# Patient Record
Sex: FEMALE | Race: WHITE | Employment: FULL TIME | URBAN - METROPOLITAN AREA
[De-identification: names, ages, dates, MRNs, and addresses within clinical notes are randomized per-mention and may not be internally consistent; named-entity substitution may affect disease eponyms.]

---

## 2017-03-25 ENCOUNTER — APPOINTMENT (OUTPATIENT)
Dept: ULTRASOUND IMAGING | Facility: HOSPITAL | Age: 22
End: 2017-03-25
Attending: EMERGENCY MEDICINE
Payer: MEDICAID

## 2017-03-25 ENCOUNTER — HOSPITAL ENCOUNTER (EMERGENCY)
Facility: HOSPITAL | Age: 22
Discharge: HOME | End: 2017-03-26
Attending: EMERGENCY MEDICINE
Payer: MEDICAID

## 2017-03-25 VITALS
SYSTOLIC BLOOD PRESSURE: 112 MMHG | TEMPERATURE: 98 F | HEART RATE: 64 BPM | WEIGHT: 143 LBS | HEIGHT: 64 IN | BODY MASS INDEX: 24.41 KG/M2 | OXYGEN SATURATION: 100 % | RESPIRATION RATE: 12 BRPM | DIASTOLIC BLOOD PRESSURE: 74 MMHG

## 2017-03-25 DIAGNOSIS — R10.2 PELVIC PAIN: Primary | ICD-10-CM

## 2017-03-25 LAB
B-HCG UR QL: NEGATIVE
BILIRUB UR QL: NEGATIVE
CLARITY UR: CLEAR
COLOR UR: YELLOW
GLUCOSE UR-MCNC: NEGATIVE MG/DL
HGB UR QL STRIP.AUTO: NEGATIVE
KETONES UR-MCNC: NEGATIVE MG/DL
NITRITE UR QL STRIP.AUTO: NEGATIVE
PH UR: 6 [PH] (ref 5–8)
PROT UR-MCNC: NEGATIVE MG/DL
RBC #/AREA URNS AUTO: 2 /HPF
SP GR UR STRIP: 1 (ref 1–1.03)
UROBILINOGEN UR STRIP-ACNC: <2
VIT C UR-MCNC: NEGATIVE MG/DL
WBC #/AREA URNS AUTO: 4 /HPF

## 2017-03-25 PROCEDURE — 81025 URINE PREGNANCY TEST: CPT

## 2017-03-25 PROCEDURE — 87106 FUNGI IDENTIFICATION YEAST: CPT | Performed by: EMERGENCY MEDICINE

## 2017-03-25 PROCEDURE — 87205 SMEAR GRAM STAIN: CPT | Performed by: EMERGENCY MEDICINE

## 2017-03-25 PROCEDURE — 81001 URINALYSIS AUTO W/SCOPE: CPT | Performed by: EMERGENCY MEDICINE

## 2017-03-25 PROCEDURE — 87591 N.GONORRHOEAE DNA AMP PROB: CPT | Performed by: EMERGENCY MEDICINE

## 2017-03-25 PROCEDURE — 93975 VASCULAR STUDY: CPT

## 2017-03-25 PROCEDURE — 87491 CHLMYD TRACH DNA AMP PROBE: CPT | Performed by: EMERGENCY MEDICINE

## 2017-03-25 PROCEDURE — 99284 EMERGENCY DEPT VISIT MOD MDM: CPT

## 2017-03-25 PROCEDURE — 76830 TRANSVAGINAL US NON-OB: CPT

## 2017-03-25 PROCEDURE — 87808 TRICHOMONAS ASSAY W/OPTIC: CPT | Performed by: EMERGENCY MEDICINE

## 2017-03-25 PROCEDURE — 76856 US EXAM PELVIC COMPLETE: CPT

## 2017-03-25 RX ORDER — TRAMADOL HYDROCHLORIDE 50 MG/1
50 TABLET ORAL EVERY 8 HOURS PRN
Qty: 10 TABLET | Refills: 0 | Status: SHIPPED | OUTPATIENT
Start: 2017-03-25

## 2017-03-26 LAB
C TRACH DNA SPEC QL NAA+PROBE: POSITIVE
N GONORRHOEA DNA SPEC QL NAA+PROBE: NEGATIVE

## 2017-03-26 NOTE — ED PROVIDER NOTES
Patient Seen in: [facility] AND [facility] Emergency Department    History   Patient presents with:  Abdomen/Flank Pain (GI/)    Stated Complaint: abd pain    HPI    Healthy 42-year-old female who smokes with no abdominal surgeries who presents with bilateral intact distal pulses. Pulmonary/Chest: Effort normal. No respiratory distress. Abdominal: Soft. no rigidity guarding or distention. Patient has pain in both lower quadrants and suprapubic areas. The pain is not localized otherwise.    Genitourinary: [id number] [address] [phone]    Schedule an appointment as soon as possible for a visit in 2 days        Medications Prescribed:  Current Discharge Medication List    START taking these medications    TraMADol HCl 50 MG Oral Tab  Take 1 tab

## 2017-03-26 NOTE — ED NOTES
Patient updated with plan of care. Report received from JORDEN. Patient returned from us. Swabs sent to lab. All needs attended to call light in reach.

## 2017-03-28 LAB
GENITAL VAGINOSIS SCREEN: NEGATIVE
TRICHOMONAS SCREEN: NEGATIVE

## 2017-03-29 NOTE — PROGRESS NOTES
Quick Note:    This patient needs treatment for chlamydia with doxycycline 100 mg PO BID for 14 days. The patient also needs to be informed that they cannot have intercourse with any untreated or untested partners.   ______

## 2017-05-08 ENCOUNTER — HOSPITAL ENCOUNTER (EMERGENCY)
Facility: HOSPITAL | Age: 22
Discharge: HOME | End: 2017-05-08
Attending: EMERGENCY MEDICINE

## 2017-05-08 VITALS
OXYGEN SATURATION: 100 % | TEMPERATURE: 98 F | WEIGHT: 165 LBS | HEART RATE: 56 BPM | RESPIRATION RATE: 18 BRPM | DIASTOLIC BLOOD PRESSURE: 60 MMHG | SYSTOLIC BLOOD PRESSURE: 112 MMHG | BODY MASS INDEX: 28.17 KG/M2 | HEIGHT: 64 IN

## 2017-05-08 DIAGNOSIS — N30.01 ACUTE CYSTITIS WITH HEMATURIA: Primary | ICD-10-CM

## 2017-05-08 PROCEDURE — 81001 URINALYSIS AUTO W/SCOPE: CPT

## 2017-05-08 PROCEDURE — 87808 TRICHOMONAS ASSAY W/OPTIC: CPT | Performed by: EMERGENCY MEDICINE

## 2017-05-08 PROCEDURE — 87106 FUNGI IDENTIFICATION YEAST: CPT | Performed by: EMERGENCY MEDICINE

## 2017-05-08 PROCEDURE — 87205 SMEAR GRAM STAIN: CPT | Performed by: EMERGENCY MEDICINE

## 2017-05-08 PROCEDURE — 87591 N.GONORRHOEAE DNA AMP PROB: CPT | Performed by: EMERGENCY MEDICINE

## 2017-05-08 PROCEDURE — 99284 EMERGENCY DEPT VISIT MOD MDM: CPT

## 2017-05-08 PROCEDURE — 87086 URINE CULTURE/COLONY COUNT: CPT

## 2017-05-08 PROCEDURE — 81025 URINE PREGNANCY TEST: CPT

## 2017-05-08 PROCEDURE — 87491 CHLMYD TRACH DNA AMP PROBE: CPT | Performed by: EMERGENCY MEDICINE

## 2017-05-08 RX ORDER — NITROFURANTOIN 25; 75 MG/1; MG/1
100 CAPSULE ORAL 2 TIMES DAILY
Qty: 14 CAPSULE | Refills: 0 | Status: SHIPPED | OUTPATIENT
Start: 2017-05-08 | End: 2017-05-15

## 2017-05-08 RX ORDER — PHENAZOPYRIDINE HYDROCHLORIDE 100 MG/1
100 TABLET, FILM COATED ORAL 3 TIMES DAILY PRN
Qty: 6 TABLET | Refills: 0 | Status: SHIPPED | OUTPATIENT
Start: 2017-05-08 | End: 2017-05-15

## 2017-05-09 NOTE — ED PROVIDER NOTES
Patient Seen in: [facility] AND [facility] Emergency Department    History   Patient presents with:  Abdomen/Flank Pain (GI/)    Stated Complaint: stomach ache x 2 days    HPI    81-year-old female without significant past medical history presents with complai 98.4 °F (36.9 °C) (Oral)  Resp 18  Ht 162.6 cm (5' 4\")  Wt 74.844 kg  BMI 28.31 kg/m2  SpO2 100%  LMP 12/03/2016        Physical Exam    General Appearance: alert, no distress  Eyes: pupils equal and round no pallor or injection  ENT, Mouth: mucous Amrit Bench diagnosis)    Disposition:  Discharge    Follow-up:  Misael Abdi MD  [address] [facility]  [facility] [address] [phone]            Medications Prescribed:  Current Discharge Medication List    START taking these medications    Nitrofur

## 2017-06-25 ENCOUNTER — HOSPITAL ENCOUNTER (EMERGENCY)
Facility: HOSPITAL | Age: 22
Discharge: HOME | End: 2017-06-25
Attending: EMERGENCY MEDICINE
Payer: MEDICAID

## 2017-06-25 ENCOUNTER — APPOINTMENT (OUTPATIENT)
Dept: GENERAL RADIOLOGY | Facility: HOSPITAL | Age: 22
End: 2017-06-25
Attending: EMERGENCY MEDICINE
Payer: MEDICAID

## 2017-06-25 VITALS
HEART RATE: 77 BPM | TEMPERATURE: 98 F | DIASTOLIC BLOOD PRESSURE: 78 MMHG | SYSTOLIC BLOOD PRESSURE: 120 MMHG | RESPIRATION RATE: 18 BRPM | OXYGEN SATURATION: 100 %

## 2017-06-25 DIAGNOSIS — S93.401A MILD ANKLE SPRAIN, RIGHT, INITIAL ENCOUNTER: Primary | ICD-10-CM

## 2017-06-25 PROCEDURE — 99283 EMERGENCY DEPT VISIT LOW MDM: CPT

## 2017-06-25 PROCEDURE — 73630 X-RAY EXAM OF FOOT: CPT | Performed by: EMERGENCY MEDICINE

## 2017-06-25 PROCEDURE — 73610 X-RAY EXAM OF ANKLE: CPT | Performed by: EMERGENCY MEDICINE

## 2017-06-25 NOTE — ED NOTES
CMS intact, pt denies numbness/tingling. Pedal pulse 2+, pt can wiggle toes, moderate strength plantar flexion/extension. Pt given phone to call ride home.
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
## 2018-01-22 ENCOUNTER — APPOINTMENT (OUTPATIENT)
Dept: OTHER | Facility: HOSPITAL | Age: 23
End: 2018-01-22
Attending: FAMILY MEDICINE

## 2019-07-25 ENCOUNTER — NURSE ONLY (OUTPATIENT)
Dept: INTERNAL MEDICINE CLINIC | Facility: HOSPITAL | Age: 24
End: 2019-07-25
Attending: EMERGENCY MEDICINE

## 2019-07-25 DIAGNOSIS — Z00.00 WELLNESS EXAMINATION: Primary | ICD-10-CM

## 2019-07-25 LAB
RUBV IGG SER QL: POSITIVE
RUBV IGG SER-ACNC: 114.5 IU/ML (ref 10–?)

## 2019-07-25 PROCEDURE — 86787 VARICELLA-ZOSTER ANTIBODY: CPT

## 2019-07-25 PROCEDURE — 86765 RUBEOLA ANTIBODY: CPT

## 2019-07-25 PROCEDURE — 86735 MUMPS ANTIBODY: CPT

## 2019-07-25 PROCEDURE — 86480 TB TEST CELL IMMUN MEASURE: CPT

## 2019-07-25 PROCEDURE — 86762 RUBELLA ANTIBODY: CPT

## 2019-07-26 LAB
M TB IFN-G CD4+ T-CELLS BLD-ACNC: 0.09 IU/ML
M TB TUBERC IFN-G BLD QL: NEGATIVE
M TB TUBERC IGNF/MITOGEN IGNF CONTROL: 9.22 IU/ML
MEV IGG SER-ACNC: >300 AU/ML (ref 30–?)
MUV IGG SER IA-ACNC: 7.4 AU/ML (ref 11–?)
QUANTIFERON TB1 MINUS NIL: -0.03 IU/ML
QUANTIFERON TB2 MINUS NIL: -0.04 IU/ML
VZV IGG SER IA-ACNC: 1363 (ref 165–?)

## 2019-11-03 NOTE — ED INITIAL ASSESSMENT (HPI)
Pt arrives via ems after she states she has been feeling increasingly more depressed and 'she doesn't want to be here anymore'. States she has been having thoughts of harming herself but does not have a plan.  Also admits to drinking and going to half-way [place]
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
## 2019-11-03 NOTE — ED NOTES
Dad in waiting room, pt does not want to see dad or have him at bedside, advised dad of such, dad requesting we call with update, Jarad Escobedo, [phone] advised we would have to ask pt first he understands, he states if she does not want us to call him to pl

## 2019-11-03 NOTE — ED PROVIDER NOTES
Patient Seen in: [facility] AND [facility] Emergency Department    History   Patient presents with:  Alcohol Intoxication (neurologic)  Eval-P (psychiatric)      HPI    Patient presents to the ED complaining of feeling depressed.   She states that she has had geno tracheal deviation present. Cardiovascular: Normal rate and intact distal pulses. Pulmonary/Chest: Effort normal. No stridor. No respiratory distress. Abdominal: Soft. She exhibits no distension.  Musculoskeletal:         General: No deformity or manuela DIFFERENTIAL         Imaging Results Available and Reviewed while in ED:     ED Medications Administered:   Medications   acetaminophen (TYLENOL EXTRA STRENGTH) tab 1,000 mg (1,000 mg Oral Given 11/3/19 3545)         [facility]      11/03/19  8822 11/03/19  0442

## 2019-11-03 NOTE — ED PROVIDER NOTES
Patient is reevaluated once she is sober. Patient's no longer suicidal.  She was seen by behavioral health liaison and is safe for discharge home. She poses no harm to herself or others. Resources provided.

## 2019-11-03 NOTE — ED NOTES
Patient's father in triage requesting to see patient. Patient does not want to see father at this time or give him any information regarding her care. Patient's father left and asked if she can call him or her sister when discharged.  Ligia Schmitz [phone]

## 2020-02-19 VITALS
BODY MASS INDEX: 24.99 KG/M2 | TEMPERATURE: 98 F | DIASTOLIC BLOOD PRESSURE: 60 MMHG | SYSTOLIC BLOOD PRESSURE: 110 MMHG | OXYGEN SATURATION: 100 % | RESPIRATION RATE: 18 BRPM | WEIGHT: 150 LBS | HEART RATE: 56 BPM | HEIGHT: 65 IN

## 2020-02-19 PROCEDURE — 87086 URINE CULTURE/COLONY COUNT: CPT | Performed by: EMERGENCY MEDICINE

## 2020-02-19 PROCEDURE — 87086 URINE CULTURE/COLONY COUNT: CPT

## 2020-02-19 PROCEDURE — 99283 EMERGENCY DEPT VISIT LOW MDM: CPT

## 2020-02-19 PROCEDURE — 81001 URINALYSIS AUTO W/SCOPE: CPT | Performed by: EMERGENCY MEDICINE

## 2020-02-19 PROCEDURE — 81001 URINALYSIS AUTO W/SCOPE: CPT

## 2020-02-20 ENCOUNTER — HOSPITAL ENCOUNTER (EMERGENCY)
Facility: HOSPITAL | Age: 25
Discharge: HOME | End: 2020-02-20
Attending: EMERGENCY MEDICINE
Payer: MEDICAID

## 2020-02-20 DIAGNOSIS — N30.00 ACUTE CYSTITIS WITHOUT HEMATURIA: Primary | ICD-10-CM

## 2020-02-20 LAB
B-HCG UR QL: NEGATIVE
BACTERIA UR QL AUTO: NEGATIVE /HPF
BILIRUB UR QL: NEGATIVE
COLOR UR: YELLOW
GLUCOSE UR-MCNC: NEGATIVE MG/DL
HGB UR QL STRIP.AUTO: NEGATIVE
KETONES UR-MCNC: NEGATIVE MG/DL
NITRITE UR QL STRIP.AUTO: NEGATIVE
PH UR: 7 [PH] (ref 5–8)
PROT UR-MCNC: NEGATIVE MG/DL
RBC #/AREA URNS AUTO: 2 /HPF
SP GR UR STRIP: 1.02 (ref 1–1.03)
UROBILINOGEN UR STRIP-ACNC: <2
WBC #/AREA URNS AUTO: 25 /HPF

## 2020-02-20 PROCEDURE — 81025 URINE PREGNANCY TEST: CPT

## 2020-02-20 RX ORDER — CEPHALEXIN 500 MG/1
500 CAPSULE ORAL ONCE
Status: COMPLETED | OUTPATIENT
Start: 2020-02-20 | End: 2020-02-20

## 2020-02-20 RX ORDER — CEPHALEXIN 500 MG/1
500 CAPSULE ORAL 2 TIMES DAILY
Qty: 14 CAPSULE | Refills: 0 | Status: SHIPPED | OUTPATIENT
Start: 2020-02-20 | End: 2020-02-27

## 2020-02-20 NOTE — ED PROVIDER NOTES
Patient Seen in: [facility] AND [facility] Emergency Department    History   Patient presents with:  Urinary Symptoms    Stated Complaint: uti      HPI    28-year-old female without past medical history presenting for evaluation of 2 days of dysuria associated wit tenderness. Musculoskeletal: No gross deformity. Neurological: Alert. Skin: Skin is warm. Psychiatric: Cooperative. Nursing note and vitals reviewed.         ED Course     Labs Reviewed   URINALYSIS WITH CULTURE REFLEX - Abnormal; Notable for the fol

## 2020-02-20 NOTE — ED NOTES
Patient presents with painful urination and frequency. Denies any blood, or pain in the abd or back. No other symptoms to report at this time  Patient is resting comfortably on stretcher at this time.

## 2020-03-15 ENCOUNTER — APPOINTMENT (OUTPATIENT)
Dept: GENERAL RADIOLOGY | Facility: HOSPITAL | Age: 25
End: 2020-03-15
Payer: MEDICAID

## 2020-03-15 ENCOUNTER — HOSPITAL ENCOUNTER (EMERGENCY)
Facility: HOSPITAL | Age: 25
Discharge: HOME | End: 2020-03-15
Payer: MEDICAID

## 2020-03-15 VITALS
SYSTOLIC BLOOD PRESSURE: 103 MMHG | WEIGHT: 155 LBS | OXYGEN SATURATION: 99 % | HEART RATE: 61 BPM | RESPIRATION RATE: 20 BRPM | BODY MASS INDEX: 26 KG/M2 | DIASTOLIC BLOOD PRESSURE: 69 MMHG | TEMPERATURE: 98 F

## 2020-03-15 DIAGNOSIS — J06.9 UPPER RESPIRATORY TRACT INFECTION, UNSPECIFIED TYPE: Primary | ICD-10-CM

## 2020-03-15 LAB — B-HCG UR QL: NEGATIVE

## 2020-03-15 PROCEDURE — 99283 EMERGENCY DEPT VISIT LOW MDM: CPT

## 2020-03-15 PROCEDURE — 71046 X-RAY EXAM CHEST 2 VIEWS: CPT

## 2020-03-15 PROCEDURE — 81025 URINE PREGNANCY TEST: CPT

## 2020-03-15 NOTE — ED PROVIDER NOTES
Patient Seen in: [facility] AND [facility] Emergency Department      History   Patient presents with:  Cough/URI    Stated Complaint: cough X 1 day     HPI    22year old female presents with dry cough for 2 days  No fever. No sorethroat. No rash. No N/V/D.  Pt General: No tenderness. Skin:     General: Skin is warm and dry. Capillary Refill: Capillary refill takes less than 2 seconds. Findings: No rash. Neurological:      General: No focal deficit present.       Mental Status: She is alert and orien

## (undated) NOTE — ED AVS SNAPSHOT
Thor Galindo   MRN: [id number]    Department:  [facility] Emergency Department   Date of Visit:  2/19/2020           Disclosure     Insurance plans vary and the physician(s) referred by the ER may not be covered by your plan.  Please conta CARE PHYSICIAN AT ONCE OR RETURN IMMEDIATELY TO THE EMERGENCY DEPARTMENT. If you have been prescribed any medication(s), please fill your prescription right away and begin taking the medication(s) as directed.   If you believe that any of the medications

## (undated) NOTE — ED AVS SNAPSHOT
[facility] Emergency Department    [address]     [address] [place] [address]    Phone:  [phone]    Fax:  [phone]           Lisa Stewart   MRN: [id number]    Department:  [facility] Emergency Department   Date of Visit:  5 Disclosure     Insurance plans vary and the physician(s) referred by the ER may not be covered by your plan. Please contact your insurance company to determine coverage and benefits available for follow-up care and referrals.       If you have difficulty sc prescription right away and begin taking the medication(s) as directed.   If you believe that any of the medications or instructions on this list is different from what your Primary Care doctor has instructed you - please continue to take your medications a Patient [address] to help you get signed up for insurance coverage. Patient [address] is a Federal Navigator program that can help with your Affordable Care Act coverage, as well as all types of Medicaid plans.   To get signed up and covere

## (undated) NOTE — ED AVS SNAPSHOT
[facility] Emergency Department    [address] [address]    Phone:  [phone]    Fax:  [phone]           Jasmine Alberta   MRN: [id number]    Department:  [facility] Emergency Department   Date of Visit:  5 and Class Registration line at [phone] or find a doctor online by visiting [email or web address].    IF THERE IS ANY CHANGE OR WORSENING OF YOUR CONDITION, CALL YOUR PRIMARY CARE PHYSICIAN AT ONCE OR RETURN IMMEDIATELY TO [address] [place] [address].     If

## (undated) NOTE — LETTER
May 8, 2017    Patient: Lauryn Rey   Date of Visit: 5/8/2017       To Whom It May Concern:    Reggie Cary was seen and treated in our emergency department on 5/8/2017. She May return to work 5-9-2017.      If you have any questions or mojgan

## (undated) NOTE — ED AVS SNAPSHOT
[facility] Emergency Department    [address]     [address] [place] [address]    Phone:  [phone]    Fax:  [phone]           Alireza Casper   MRN: [id number]    Department:  [facility] Emergency Department   Date of Visit:  3 and Class Registration line at [phone] or find a doctor online by visiting [email or web address].    IF THERE IS ANY CHANGE OR WORSENING OF YOUR CONDITION, CALL YOUR PRIMARY CARE PHYSICIAN AT ONCE OR RETURN IMMEDIATELY TO [address] [place] [address].     If

## (undated) NOTE — LETTER
Date & Time: 3/15/2020, 6:02 PM  Patient: Donal Gagnon  Encounter Provider(s):    MARTIN Gallardo       To Whom It May Concern:    Avni Ford was seen and treated in our department on 3/15/2020.  She can return to work on 3/16/202

## (undated) NOTE — ED AVS SNAPSHOT
Lawana Ormond   MRN: [id number]    Department:  [facility] Emergency Department   Date of Visit:  3/15/2020           Disclosure     Insurance plans vary and the physician(s) referred by the ER may not be covered by your plan.  Please conta CARE PHYSICIAN AT ONCE OR RETURN IMMEDIATELY TO THE EMERGENCY DEPARTMENT. If you have been prescribed any medication(s), please fill your prescription right away and begin taking the medication(s) as directed.   If you believe that any of the medications

## (undated) NOTE — ED AVS SNAPSHOT
[facility] Emergency Department    [address]     [address] [place] [address]    Phone:  [phone]    Fax:  [phone]           Saúl Mckay   MRN: [id number]    Department:  [facility] Emergency Department   Date of Visit:  3 Si tiene problemas para programar kristian diana de seguimiento según lo indicado, llame al encargado de ellyn al [phone]. It is our goal to assure that you are completely satisfied with every aspect of your visit today.   In an effort to constantly impr list to your next doctor's appointment. Any imaging studies and labs completed today can be reviewed in your MyChart account. You may have had testing done that requires us to contact you. Please make sure we have your correct phone number on file. Sign up for [employer], your secure online medical record. [employer] will allow you to access patient instructions from your recent visit,  view other health information, and more. To sign up or find more information, go to [email or web address] and cl

## (undated) NOTE — ED AVS SNAPSHOT
[facility] Emergency Department  [address]   [address] [place] [address]  Phone:  [phone]  Fax:  [phone]          Makeda Low   MRN: [id number]    Department:  [facility] Emergency Department   Date of Visit:  6/25/2017 and Class Registration line at [phone] or find a doctor online by visiting [email or web address].    IF THERE IS ANY CHANGE OR WORSENING OF YOUR CONDITION, CALL YOUR PRIMARY CARE PHYSICIAN AT ONCE OR RETURN IMMEDIATELY TO [address] [place] [address].     If

## (undated) NOTE — LETTER
Date & Time: 4/3/2017, 9:10 AM  Patient: Lula De La O    Dear Lula De La O,    We are unable to reach you by phone and would like to speak to you regarding your visit to the Emergency Department.         Please contact the Emergency Department

## (undated) NOTE — ED AVS SNAPSHOT
Isaias John   MRN: [id number]    Department:  [facility] Emergency Department   Date of Visit:  11/3/2019           Disclosure     Insurance plans vary and the physician(s) referred by the ER may not be covered by your plan.  Please conta CARE PHYSICIAN AT ONCE OR RETURN IMMEDIATELY TO THE EMERGENCY DEPARTMENT. If you have been prescribed any medication(s), please fill your prescription right away and begin taking the medication(s) as directed.   If you believe that any of the medications